# Patient Record
Sex: MALE | Race: ASIAN | HISPANIC OR LATINO
[De-identification: names, ages, dates, MRNs, and addresses within clinical notes are randomized per-mention and may not be internally consistent; named-entity substitution may affect disease eponyms.]

---

## 2020-06-22 PROBLEM — Z00.00 ENCOUNTER FOR PREVENTIVE HEALTH EXAMINATION: Status: ACTIVE | Noted: 2020-06-22

## 2020-08-06 ENCOUNTER — APPOINTMENT (OUTPATIENT)
Dept: ENDOCRINOLOGY | Facility: CLINIC | Age: 70
End: 2020-08-06
Payer: MEDICAID

## 2020-08-06 VITALS
HEART RATE: 79 BPM | SYSTOLIC BLOOD PRESSURE: 152 MMHG | OXYGEN SATURATION: 98 % | DIASTOLIC BLOOD PRESSURE: 78 MMHG | TEMPERATURE: 97.3 F | WEIGHT: 170 LBS

## 2020-08-06 DIAGNOSIS — E78.5 HYPERLIPIDEMIA, UNSPECIFIED: ICD-10-CM

## 2020-08-06 DIAGNOSIS — Z83.3 FAMILY HISTORY OF DIABETES MELLITUS: ICD-10-CM

## 2020-08-06 DIAGNOSIS — Z95.5 PRESENCE OF CORONARY ANGIOPLASTY IMPLANT AND GRAFT: ICD-10-CM

## 2020-08-06 DIAGNOSIS — Z86.79 PERSONAL HISTORY OF OTHER DISEASES OF THE CIRCULATORY SYSTEM: ICD-10-CM

## 2020-08-06 DIAGNOSIS — E53.8 DEFICIENCY OF OTHER SPECIFIED B GROUP VITAMINS: ICD-10-CM

## 2020-08-06 PROCEDURE — 99205 OFFICE O/P NEW HI 60 MIN: CPT

## 2020-08-06 PROCEDURE — 95251 CONT GLUC MNTR ANALYSIS I&R: CPT

## 2020-08-06 PROCEDURE — 95250 CONT GLUC MNTR PHYS/QHP EQP: CPT

## 2020-08-06 RX ORDER — CLOPIDOGREL 75 MG/1
75 TABLET, FILM COATED ORAL
Refills: 0 | Status: ACTIVE | COMMUNITY

## 2020-08-15 RX ORDER — EMPAGLIFLOZIN 25 MG/1
25 TABLET, FILM COATED ORAL
Qty: 90 | Refills: 3 | Status: DISCONTINUED | COMMUNITY
Start: 2020-08-06 | End: 2020-08-15

## 2020-12-23 LAB
CREAT SPEC-SCNC: 45 MG/DL
MICROALBUMIN 24H UR DL<=1MG/L-MCNC: 1.6 MG/DL
MICROALBUMIN/CREAT 24H UR-RTO: 35 MG/G

## 2020-12-28 ENCOUNTER — APPOINTMENT (OUTPATIENT)
Dept: GASTROENTEROLOGY | Facility: CLINIC | Age: 70
End: 2020-12-28

## 2021-02-24 ENCOUNTER — APPOINTMENT (OUTPATIENT)
Dept: GASTROENTEROLOGY | Facility: CLINIC | Age: 71
End: 2021-02-24
Payer: MEDICARE

## 2021-02-24 VITALS
SYSTOLIC BLOOD PRESSURE: 140 MMHG | HEIGHT: 65 IN | WEIGHT: 166 LBS | DIASTOLIC BLOOD PRESSURE: 70 MMHG | BODY MASS INDEX: 27.66 KG/M2 | TEMPERATURE: 98 F

## 2021-02-24 DIAGNOSIS — D64.9 ANEMIA, UNSPECIFIED: ICD-10-CM

## 2021-02-24 DIAGNOSIS — K59.00 CONSTIPATION, UNSPECIFIED: ICD-10-CM

## 2021-02-24 DIAGNOSIS — K21.9 GASTRO-ESOPHAGEAL REFLUX DISEASE W/OUT ESOPHAGITIS: ICD-10-CM

## 2021-02-24 PROCEDURE — 99072 ADDL SUPL MATRL&STAF TM PHE: CPT

## 2021-02-24 PROCEDURE — 99204 OFFICE O/P NEW MOD 45 MIN: CPT

## 2021-02-24 RX ORDER — POLYETHYLENE GLYCOL 3350 17 G/17G
17 POWDER, FOR SOLUTION ORAL DAILY
Qty: 30 | Refills: 3 | Status: ACTIVE | COMMUNITY
Start: 2021-02-24 | End: 1900-01-01

## 2021-02-24 RX ORDER — FAMOTIDINE 40 MG/1
40 TABLET, FILM COATED ORAL
Qty: 30 | Refills: 3 | Status: ACTIVE | COMMUNITY
Start: 2021-02-24 | End: 1900-01-01

## 2021-02-24 NOTE — PHYSICAL EXAM
[General Appearance - Alert] : alert [General Appearance - In No Acute Distress] : in no acute distress [Sclera] : the sclera and conjunctiva were normal [Hearing Threshold Finger Rub Not [place]] : hearing was normal [Respiration, Rhythm And Depth] : normal respiratory rhythm and effort [Auscultation Breath Sounds / Voice Sounds] : lungs were clear to auscultation bilaterally [Heart Sounds] : normal S1 and S2 [Bowel Sounds] : normal bowel sounds [Abdomen Soft] : soft [Abdomen Tenderness] : non-tender [Abnormal Walk] : normal gait [Nail Clubbing] : no clubbing  or cyanosis of the fingernails [] : no rash [Skin Lesions] : no skin lesions [Edema] : there was no peripheral edema [No Focal Deficits] : no focal deficits [Oriented To Time, Place, And Person] : oriented to person, place, and time

## 2021-02-24 NOTE — HISTORY OF PRESENT ILLNESS
[FreeTextEntry1] : 71 yo  male with h/o CAD s/p stent in past, DM, HTN with c/o gerd partially relieved on ppi for 3 years, pt with h/o anemia started on iron and hgb increased. Patient reports occasional constipation. no brbpr, no melena. no weight loss. no dysphagia, no n/v.\par \par no family h/o colon or gastric cancer\par h/o egd/colonoscopy per patient in [place] 4 years ago, not clean per patient.

## 2021-02-24 NOTE — ASSESSMENT
[FreeTextEntry1] : 1. GERD, pt on asa/plavix for h/o CAD with stent, refractory to ppi daily\par \par plan strict antireflux diet diet\par           add famotidine qhs\par        recommend egd r/o esophagitis, after cardiology clearance\par \par 2. constipation, h/o anemia ,on iron, recommend colonoscopy to r/o polyp,mass etc\par \par plan colonoscopy after cardiology clearance\par          miralax daily

## 2021-02-24 NOTE — REVIEW OF SYSTEMS
[As noted in HPI] : as noted in HPI [As Noted in HPI] : as noted in HPI [Fever] : no fever [Chills] : no chills [Eyesight Problems] : no eyesight problems [Nosebleeds] : no nosebleeds [Hesitancy] : no urinary hesitancy [Nocturia] : no nocturia [Joint Swelling] : no joint swelling [Dizziness] : no dizziness [Fainting] : no fainting [Anxiety] : no anxiety [Depression] : no depression [Muscle Weakness] : no muscle weakness [Erectile Dysfunction] : no erectile dysfunction [Easy Bleeding] : no tendency for easy bleeding [Easy Bruising] : no tendency for easy bruising [Swollen Glands] : no swollen glands

## 2021-03-15 ENCOUNTER — APPOINTMENT (OUTPATIENT)
Dept: ENDOCRINOLOGY | Facility: CLINIC | Age: 71
End: 2021-03-15

## 2021-04-06 ENCOUNTER — APPOINTMENT (OUTPATIENT)
Dept: ENDOCRINOLOGY | Facility: CLINIC | Age: 71
End: 2021-04-06
Payer: MEDICARE

## 2021-04-06 VITALS
DIASTOLIC BLOOD PRESSURE: 70 MMHG | HEIGHT: 65 IN | HEART RATE: 76 BPM | OXYGEN SATURATION: 99 % | SYSTOLIC BLOOD PRESSURE: 120 MMHG | BODY MASS INDEX: 27.66 KG/M2 | WEIGHT: 166 LBS

## 2021-04-06 LAB
GLUCOSE BLDC GLUCOMTR-MCNC: 176
HBA1C MFR BLD HPLC: 7.5

## 2021-04-06 PROCEDURE — 99072 ADDL SUPL MATRL&STAF TM PHE: CPT

## 2021-04-06 PROCEDURE — 99214 OFFICE O/P EST MOD 30 MIN: CPT | Mod: 25

## 2021-04-06 PROCEDURE — 83036 HEMOGLOBIN GLYCOSYLATED A1C: CPT | Mod: QW

## 2021-08-09 ENCOUNTER — APPOINTMENT (OUTPATIENT)
Dept: ENDOCRINOLOGY | Facility: CLINIC | Age: 71
End: 2021-08-09
Payer: MEDICAID

## 2021-08-09 VITALS
WEIGHT: 165 LBS | BODY MASS INDEX: 27.49 KG/M2 | HEART RATE: 74 BPM | DIASTOLIC BLOOD PRESSURE: 80 MMHG | HEIGHT: 65 IN | TEMPERATURE: 97.7 F | SYSTOLIC BLOOD PRESSURE: 124 MMHG | OXYGEN SATURATION: 99 %

## 2021-08-09 PROCEDURE — 95251 CONT GLUC MNTR ANALYSIS I&R: CPT

## 2021-08-09 PROCEDURE — 95250 CONT GLUC MNTR PHYS/QHP EQP: CPT

## 2021-08-09 PROCEDURE — 99214 OFFICE O/P EST MOD 30 MIN: CPT

## 2021-08-09 RX ORDER — INSULIN LISPRO 100 U/ML
100 INJECTION, SOLUTION SUBCUTANEOUS
Qty: 3 | Refills: 1 | Status: DISCONTINUED | COMMUNITY
Start: 1900-01-01 | End: 2021-08-09

## 2021-08-09 RX ORDER — EMPAGLIFLOZIN 25 MG/1
25 TABLET, FILM COATED ORAL
Qty: 90 | Refills: 1 | Status: DISCONTINUED | COMMUNITY
Start: 2021-04-29 | End: 2021-08-09

## 2021-08-10 LAB
CREAT SPEC-SCNC: 22 MG/DL
MICROALBUMIN 24H UR DL<=1MG/L-MCNC: <1.2 MG/DL
MICROALBUMIN/CREAT 24H UR-RTO: NORMAL MG/G

## 2021-08-24 ENCOUNTER — NON-APPOINTMENT (OUTPATIENT)
Age: 71
End: 2021-08-24

## 2021-08-24 RX ORDER — BLOOD-GLUCOSE METER
W/DEVICE EACH MISCELLANEOUS
Qty: 1 | Refills: 0 | Status: ACTIVE | COMMUNITY
Start: 2021-08-24 | End: 1900-01-01

## 2021-09-28 ENCOUNTER — NON-APPOINTMENT (OUTPATIENT)
Age: 71
End: 2021-09-28

## 2021-10-01 ENCOUNTER — RX RENEWAL (OUTPATIENT)
Age: 71
End: 2021-10-01

## 2021-10-04 ENCOUNTER — RX RENEWAL (OUTPATIENT)
Age: 71
End: 2021-10-04

## 2021-11-09 ENCOUNTER — APPOINTMENT (OUTPATIENT)
Dept: ENDOCRINOLOGY | Facility: CLINIC | Age: 71
End: 2021-11-09
Payer: MEDICAID

## 2021-11-09 ENCOUNTER — RESULT CHARGE (OUTPATIENT)
Age: 71
End: 2021-11-09

## 2021-11-09 VITALS — BODY MASS INDEX: 26.39 KG/M2 | WEIGHT: 158.6 LBS

## 2021-11-09 LAB
GLUCOSE BLDC GLUCOMTR-MCNC: 239
HBA1C MFR BLD HPLC: 7.4

## 2021-11-09 PROCEDURE — G0108 DIAB MANAGE TRN  PER INDIV: CPT

## 2021-11-09 PROCEDURE — 83036 HEMOGLOBIN GLYCOSYLATED A1C: CPT | Mod: QW

## 2021-11-09 RX ORDER — LANCETS 30 GAUGE
EACH MISCELLANEOUS
Qty: 4 | Refills: 2 | Status: ACTIVE | COMMUNITY
Start: 2020-08-06 | End: 1900-01-01

## 2022-02-18 ENCOUNTER — APPOINTMENT (OUTPATIENT)
Dept: ENDOCRINOLOGY | Facility: CLINIC | Age: 72
End: 2022-02-18

## 2022-03-07 ENCOUNTER — RX RENEWAL (OUTPATIENT)
Age: 72
End: 2022-03-07

## 2022-03-07 RX ORDER — BLOOD SUGAR DIAGNOSTIC
STRIP MISCELLANEOUS
Qty: 350 | Refills: 0 | Status: ACTIVE | COMMUNITY
Start: 2020-08-06 | End: 1900-01-01

## 2022-04-13 ENCOUNTER — APPOINTMENT (OUTPATIENT)
Dept: ENDOCRINOLOGY | Facility: CLINIC | Age: 72
End: 2022-04-13
Payer: MEDICARE

## 2022-04-13 VITALS
DIASTOLIC BLOOD PRESSURE: 70 MMHG | WEIGHT: 152 LBS | TEMPERATURE: 96.7 F | HEART RATE: 79 BPM | OXYGEN SATURATION: 99 % | SYSTOLIC BLOOD PRESSURE: 126 MMHG | BODY MASS INDEX: 25.33 KG/M2 | HEIGHT: 65 IN

## 2022-04-13 PROCEDURE — 99214 OFFICE O/P EST MOD 30 MIN: CPT | Mod: GC

## 2022-04-13 PROCEDURE — 82962 GLUCOSE BLOOD TEST: CPT

## 2022-04-13 PROCEDURE — 83036 HEMOGLOBIN GLYCOSYLATED A1C: CPT | Mod: QW

## 2022-04-13 RX ORDER — INSULIN ASPART 100 [IU]/ML
100 INJECTION, SOLUTION INTRAVENOUS; SUBCUTANEOUS
Qty: 2 | Refills: 1 | Status: ACTIVE | COMMUNITY
Start: 2021-04-29 | End: 1900-01-01

## 2022-04-13 RX ORDER — ERTUGLIFLOZIN 5 MG/1
5 TABLET, FILM COATED ORAL
Qty: 90 | Refills: 1 | Status: DISCONTINUED | COMMUNITY
Start: 2020-08-15 | End: 2022-04-13

## 2022-04-13 RX ORDER — INSULIN GLARGINE 100 [IU]/ML
100 INJECTION, SOLUTION SUBCUTANEOUS
Qty: 1 | Refills: 1 | Status: ACTIVE | COMMUNITY
Start: 1900-01-01 | End: 1900-01-01

## 2022-04-13 RX ORDER — ATORVASTATIN CALCIUM 40 MG/1
40 TABLET, FILM COATED ORAL
Qty: 1 | Refills: 1 | Status: ACTIVE | COMMUNITY
Start: 1900-01-01 | End: 1900-01-01

## 2022-04-14 LAB
GLUCOSE BLDC GLUCOMTR-MCNC: 231
HBA1C MFR BLD HPLC: 8.6

## 2022-04-14 NOTE — HISTORY OF PRESENT ILLNESS
[FreeTextEntry1] : 71 y/o male with T2DM (A1c 7.4% in Nov 2021 and 8.6% today). HTN and HLD.\par \par Interval Events:\par Patient is currently fasting during Ramadan and eats at sunrise, sunset 7:30pm and then dinner at 11pm\par \par DM was diagnosed 20+ years ago\par Complications include: No neuropathy, last ophtho visit was March 2021(had dilated eye exam), no h/o CVA. Has CAD s/p 3 stents (last stent in 2014).\par \par Current DM Medications/Insulin: \par Currently on Steglatro 5 mg Qday, Janumet 50/1000 mg BID, Lantus 15 units QHS, Humalog 10 units before meal at sunset, 7 units before dinner at 11pm. If FS is 70-80 then he will take 1/2 tab Janumet\par Was previously on Jardiance with Steglatro but Jardiance was stopped. Insurance sent a letter stating Jardiance is currently preferred over steglatro.\par Current Fingerstick Glucose Logs: \par Fasting - . With BG 70, felt he could not sleep. No sweats or shakes. No recent true lows.\par 2 hours after breakfast - 116-247\par before Lunch - \par After Lunch - 156-260\par Before Dinner 187-280\par After  dinner 181-351\par Bedtime 241-390\par Overnight has had BG in the 400s.\par \par Has s/s hypo when FS <60 and will correct with ice-cream or juice if too low. Other times eats some dates.\par \par Current Diet Includes: During the day also eats dates\par Breakfast 5am: oatmeal, 2 slices of bread, zeferino tea (milk)\par 7:30pm meal: Bread, vegetables, murphy\par 11pm meal: Rice and vegetables, roti\par Snacks: Dates - 3 to 4\par Drinks: no juice or soda\par \par Additional history:\par HTN - takes Losartan 25 mg QDay, Metoprolol 25 mg QDay\par HLD - Atorvastatin 40 mg QDay\par Takes Vitamin B12 QDay\par \par Patient asking about GLP-1 agonist. No hx of pancreatitis or personal or FHx of thyroid cancer.

## 2022-04-14 NOTE — END OF VISIT
[] : Fellow [FreeTextEntry3] : Agree with plan as above. Patient fasting during Ramadan, so eats a very heavy 7:30 pm sunset meal and will increase Humalog for this meal and back off Lantus QHS to avoid hypoglycemia, will also reduce Humalog at 11pm meal since its a smaller meal. Continue current oral agents. After NARGIS, at next endocrine visit, can consider stopping Januvia and starting GLP-1 agent.\par We discussed that mild hypoglycemia (BG<70mg/dl) should be corrected with 4oz juice or 3 glucose tablets and then recheck glucose in 15 minutes\par \par \par I informed the patient that I will be leaving the practice. Recommend schedule f/u with next available endocrinologist in 6 months.\par F/u Endocrine NP in 3 months\par \par Valerio Ayala DO\par \par

## 2022-04-14 NOTE — PHYSICAL EXAM
[Alert] : alert [Well Nourished] : well nourished [No Acute Distress] : no acute distress [EOMI] : extra ocular movement intact [No Proptosis] : no proptosis [No Lid Lag] : no lid lag [Supple] : the neck was supple [No Respiratory Distress] : no respiratory distress [No Accessory Muscle Use] : no accessory muscle use [Normal Rate and Effort] : normal respiratory rate and effort [Normal Rate] : heart rate was normal [Regular Rhythm] : with a regular rhythm [No Edema] : no peripheral edema [Not Tender] : non-tender [Soft] : abdomen soft [No Involuntary Movements] : no involuntary movements were seen [No Joint Swelling] : no joint swelling seen [No Rash] : no rash [Cranial Nerves Intact] : cranial nerves 2-12 were intact [No Motor Deficits] : the motor exam was normal [Oriented x3] : oriented to person, place, and time [Normal Affect] : the affect was normal [Normal Mood] : the mood was normal [Healthy Appearance] : healthy appearance [Well Developed] : well developed [Normal Hearing] : hearing was normal [Thyroid Not Enlarged] : the thyroid was not enlarged [No Murmurs] : no murmurs [Normal Bowel Sounds] : normal bowel sounds [Not Distended] : not distended [de-identified] : Has a seborrheic keratosis on left ankle.

## 2022-04-14 NOTE — REVIEW OF SYSTEMS
[All other systems negative] : All other systems negative [Fatigue] : no fatigue [Recent Weight Gain (___ Lbs)] : no recent weight gain [Recent Weight Loss (___ Lbs)] : no recent weight loss

## 2022-04-14 NOTE — ASSESSMENT
[Diabetes Foot Care] : diabetes foot care [Long Term Vascular Complications] : long term vascular complications of diabetes [Carbohydrate Consistent Diet] : carbohydrate consistent diet [Importance of Diet and Exercise] : importance of diet and exercise to improve glycemic control, achieve weight loss and improve cardiovascular health [Exercise/Effect on Glucose] : exercise/effect on glucose [Hypoglycemia Management] : hypoglycemia management [Action and use of Insulin] : action and use of short and long-acting insulin [Self Monitoring of Blood Glucose] : self monitoring of blood glucose [Injection Technique, Storage, Sharps Disposal] : injection technique, storage, and sharps disposal [Sick-Day Management] : sick-day management [Retinopathy Screening] : Patient was referred to ophthalmology for retinopathy screening [Diabetic Medications] : Risks and benefits of diabetic medications were discussed [FreeTextEntry1] : 71 y/o male with well controlled T2DM with history of moderate hypoglycemia, h/o CAD s/p 3 stents here for management. Also with HTN, HLD, and Vitamin B12 deficiency\par \par T2DM\par - I had a lengthy discussion regarding healthy diet (consistent carbohydrates and low calorie content) with the patient. I also emphasized to maintain routine exercise activity (30 minutes daily or 150 minutes in the week).\par - Goal BG \par - I discussed the importance of avoiding mild hypoglycemia (FS<70 mg/dl) and severe hypoglycemia (FS<55 mg/dl) with the patient. I also discussed hypoglycemia correction with 4 oz of juice or 4 glucose tablets and rechecking FS in 15 minutes. If FS is still low, repeat 4oz juice or 4 glucose tablets and consume 12 grams of carbohydrates.\par - Fasting for Ramadan. Will reduce Lantus to 13 units qhs. To take Admelog 14 units before 7:30pm meal (biggest meal causing Fs in 300-400s at night) and 5 units if having fruits around 10-11pm. \par - If fasting BG <100 for 5:30am meal, will take half tab of Janumet. If >100, will take whole tab.\par - Switch from Steglatro 5mg daily to Jardiance 10mg daily.\par - Takes iron but no longer on Vitamin B12 or vitamin D3\par - urine microalbumin normal in Aug 2021, already taking Losartan 25 mg QD\par - Continue to see podiatrist\par - Sending Amadou to pharmacy to wear during Ramadan. Worthwhile even if he has to pay out of [place]ocket to avoid FS checks\par \par HTN\par - Recommend continue Losartan 25 mg QD and Metoprolol 25 mg QD\par \par HLD\par - Recommend continue Atorvastatin 40 mg QHS\par - LDL 48 in Feb 2022\par \par F/u 3 month NP\par F/u 6 month endocrinologist\par \par D/w Dr. Ayala\par Jude Ritchie, \par \par \par \par \par \par \par

## 2022-07-14 ENCOUNTER — APPOINTMENT (OUTPATIENT)
Dept: ENDOCRINOLOGY | Facility: CLINIC | Age: 72
End: 2022-07-14

## 2022-07-14 VITALS
TEMPERATURE: 97.3 F | BODY MASS INDEX: 25.66 KG/M2 | DIASTOLIC BLOOD PRESSURE: 70 MMHG | WEIGHT: 154 LBS | OXYGEN SATURATION: 99 % | HEIGHT: 65 IN | HEART RATE: 72 BPM | SYSTOLIC BLOOD PRESSURE: 120 MMHG

## 2022-07-14 LAB
GLUCOSE BLDC GLUCOMTR-MCNC: 145
HBA1C MFR BLD HPLC: 7.5

## 2022-07-14 PROCEDURE — 83036 HEMOGLOBIN GLYCOSYLATED A1C: CPT | Mod: QW

## 2022-07-14 PROCEDURE — 99214 OFFICE O/P EST MOD 30 MIN: CPT | Mod: 25

## 2022-07-14 PROCEDURE — 82962 GLUCOSE BLOOD TEST: CPT

## 2022-07-14 RX ORDER — INSULIN GLARGINE 100 [IU]/ML
100 INJECTION, SOLUTION SUBCUTANEOUS
Qty: 4 | Refills: 3 | Status: DISCONTINUED | COMMUNITY
Start: 2021-08-09 | End: 2022-07-14

## 2022-07-14 RX ORDER — EMPAGLIFLOZIN 10 MG/1
10 TABLET, FILM COATED ORAL DAILY
Qty: 1 | Refills: 0 | Status: DISCONTINUED | COMMUNITY
Start: 2021-05-07 | End: 2022-07-14

## 2022-07-14 RX ORDER — FLASH GLUCOSE SCANNING READER
EACH MISCELLANEOUS
Qty: 1 | Refills: 0 | Status: DISCONTINUED | COMMUNITY
Start: 2020-08-06 | End: 2022-07-14

## 2022-07-14 RX ORDER — FLASH GLUCOSE SENSOR
KIT MISCELLANEOUS
Qty: 5 | Refills: 1 | Status: DISCONTINUED | COMMUNITY
Start: 2020-08-06 | End: 2022-07-14

## 2022-07-14 RX ORDER — ASPIRIN 81 MG/1
81 TABLET, CHEWABLE ORAL
Refills: 0 | Status: DISCONTINUED | COMMUNITY
End: 2022-07-14

## 2022-07-14 RX ORDER — FLASH GLUCOSE SCANNING READER
EACH MISCELLANEOUS
Qty: 1 | Refills: 0 | Status: DISCONTINUED | COMMUNITY
Start: 2022-04-13 | End: 2022-07-14

## 2022-07-14 RX ORDER — SITAGLIPTIN AND METFORMIN HYDROCHLORIDE 50; 1000 MG/1; MG/1
50-1000 TABLET, FILM COATED ORAL TWICE DAILY
Qty: 180 | Refills: 1 | Status: DISCONTINUED | COMMUNITY
End: 2022-07-14

## 2022-07-14 RX ORDER — FLASH GLUCOSE SENSOR
KIT MISCELLANEOUS
Qty: 2 | Refills: 3 | Status: DISCONTINUED | COMMUNITY
Start: 2022-04-13 | End: 2022-07-14

## 2022-07-14 RX ORDER — EMPAGLIFLOZIN 10 MG/1
10 TABLET, FILM COATED ORAL
Qty: 30 | Refills: 5 | Status: DISCONTINUED | COMMUNITY
Start: 2022-04-13 | End: 2022-07-14

## 2022-07-14 NOTE — HISTORY OF PRESENT ILLNESS
[FreeTextEntry1] : 71 y/o male with T2DM (A1c 7.4% in Nov 2021 and 8.6% today). HTN and HLD.\par \par DM was diagnosed 20+ years ago\par Complications include: No neuropathy, last ophtho visit was March 2021(had dilated eye exam), no h/o CVA. Has CAD s/p 3 stents (last stent in 2014).\par \par Current DM Medications/Insulin: \par Jardiance 10 mg daily\par Janumet 50-1000mg one tablet BID\par Lantus to 13 units qhs\par Admelog 14 units -- only taking before lunch and dinner\par \par Current Fingerstick Glucose Logs: \par glucose scanned. \par Sometimes checking 1-2 hours after eating. \par there is low of 53 at 2am. \par Has s/s hypo when FS <60 and will correct with ice-cream or juice if too low. Other times eats some dates.\par \par Current Diet Includes: During the day also eats dates\par Breakfast 730-8am: oatmeal, 2 slices of bread, zeferino tea (milk)\par Lunch 2-230\par Dinner 9pm\par \par Additional history:\par HTN - takes Losartan 25 mg QDay, Metoprolol 25 mg QDay\par HLD - Atorvastatin 40 mg QDay\par Takes Vitamin B12 QDay\par \par Patient asking about GLP-1 agonist. No hx of pancreatitis or personal or FHx of thyroid cancer.

## 2022-07-14 NOTE — ASSESSMENT
[Diabetes Foot Care] : diabetes foot care [Long Term Vascular Complications] : long term vascular complications of diabetes [Carbohydrate Consistent Diet] : carbohydrate consistent diet [Importance of Diet and Exercise] : importance of diet and exercise to improve glycemic control, achieve weight loss and improve cardiovascular health [Exercise/Effect on Glucose] : exercise/effect on glucose [Hypoglycemia Management] : hypoglycemia management [Action and use of Insulin] : action and use of short and long-acting insulin [Self Monitoring of Blood Glucose] : self monitoring of blood glucose [Injection Technique, Storage, Sharps Disposal] : injection technique, storage, and sharps disposal [Sick-Day Management] : sick-day management [Retinopathy Screening] : Patient was referred to ophthalmology for retinopathy screening [Diabetic Medications] : Risks and benefits of diabetic medications were discussed [FreeTextEntry1] : 71 y/o male with well controlled T2DM with history of moderate hypoglycemia, h/o CAD 3-4 2018 s/p 3 stents here for management. Also with HTN, HLD, and Vitamin B12 deficiency\par \par T2DM\par A1c 7.5% 7/14/22\par - Goal BG \par - I discussed the importance of avoiding mild hypoglycemia (FS<70 mg/dl) and severe hypoglycemia (FS<55 mg/dl) with the patient. I also discussed hypoglycemia correction with 4 oz of juice or 4 glucose tablets and rechecking FS in 15 minutes. If FS is still low, repeat 4oz juice or 4 glucose tablets and consume 12 grams of carbohydrates.\par -optho: went 5/2022, no diabetic retinopathy\par \par Jardiance increase to 25 mg daily. \par Stop Janumet.\par Start Metformin 1000 mg twice daily. \par Start Ozempic 0.25 mg once weekly \par Lantus to 13 units qhs--> Decrease to Lantus 10 units at bedtime \par Humalog 10 units before lunch and 5 units before dinner. \par \par Morning goal .\par Two hours after a meal, <180. \par \par Send Amadou. Amadou 2, will come in for training. \par \par - urine microalbumin normal in Aug 2021, already taking Losartan 25 mg QD\par - Continue to see podiatrist\par \par HTN\par - Recommend continue Losartan 25 mg QD and Metoprolol 25 mg QD\par \par HLD\par - Recommend continue Atorvastatin 40 mg QHS\par - LDL 48 in Feb 2022\par \par -Patient requires a therapeutic CGM\par -Patient has diabetes mellitus\par -Patient has been using a home blood glucose monitor and performing frequent (four times a day) testing, 6 months\par -Patient is being insulin-treated with multiple daily injections (MDI) of insulin x4day, x 6months \par -Patient insulin treatment regimen requires frequent adjustments by the Patient on the basis of therapeutic CGM testing results.\par

## 2022-07-14 NOTE — PHYSICAL EXAM
[Alert] : alert [Well Nourished] : well nourished [Healthy Appearance] : healthy appearance [No Acute Distress] : no acute distress [Well Developed] : well developed [EOMI] : extra ocular movement intact [No Proptosis] : no proptosis [No Lid Lag] : no lid lag [Normal Hearing] : hearing was normal [Supple] : the neck was supple [Thyroid Not Enlarged] : the thyroid was not enlarged [No Respiratory Distress] : no respiratory distress [No Accessory Muscle Use] : no accessory muscle use [Normal Rate and Effort] : normal respiratory rate and effort [No Murmurs] : no murmurs [Normal Rate] : heart rate was normal [Regular Rhythm] : with a regular rhythm [No Edema] : no peripheral edema [Normal Bowel Sounds] : normal bowel sounds [Not Tender] : non-tender [Not Distended] : not distended [Soft] : abdomen soft [No Involuntary Movements] : no involuntary movements were seen [No Joint Swelling] : no joint swelling seen [No Rash] : no rash [Cranial Nerves Intact] : cranial nerves 2-12 were intact [No Motor Deficits] : the motor exam was normal [Oriented x3] : oriented to person, place, and time [Normal Affect] : the affect was normal [Normal Mood] : the mood was normal [de-identified] : Has a seborrheic keratosis on left ankle.

## 2022-07-15 ENCOUNTER — NON-APPOINTMENT (OUTPATIENT)
Age: 72
End: 2022-07-15

## 2022-07-15 LAB
25(OH)D3 SERPL-MCNC: 26.9 NG/ML
ALBUMIN SERPL ELPH-MCNC: 4.5 G/DL
ALP BLD-CCNC: 69 U/L
ALT SERPL-CCNC: 18 U/L
ANION GAP SERPL CALC-SCNC: 11 MMOL/L
AST SERPL-CCNC: 16 U/L
BILIRUB SERPL-MCNC: 0.4 MG/DL
BUN SERPL-MCNC: 17 MG/DL
CALCIUM SERPL-MCNC: 9.4 MG/DL
CHLORIDE SERPL-SCNC: 102 MMOL/L
CHOLEST SERPL-MCNC: 100 MG/DL
CO2 SERPL-SCNC: 25 MMOL/L
CREAT SERPL-MCNC: 1.08 MG/DL
CREAT SPEC-SCNC: 44 MG/DL
EGFR: 74 ML/MIN/1.73M2
GLUCOSE SERPL-MCNC: 132 MG/DL
HDLC SERPL-MCNC: 40 MG/DL
LDLC SERPL CALC-MCNC: 47 MG/DL
MICROALBUMIN 24H UR DL<=1MG/L-MCNC: <1.2 MG/DL
MICROALBUMIN/CREAT 24H UR-RTO: NORMAL MG/G
NONHDLC SERPL-MCNC: 60 MG/DL
POTASSIUM SERPL-SCNC: 5 MMOL/L
PROT SERPL-MCNC: 7.4 G/DL
SODIUM SERPL-SCNC: 137 MMOL/L
TRIGL SERPL-MCNC: 66 MG/DL
TSH SERPL-ACNC: 1.82 UIU/ML
VIT B12 SERPL-MCNC: 317 PG/ML

## 2022-08-25 ENCOUNTER — APPOINTMENT (OUTPATIENT)
Dept: ENDOCRINOLOGY | Facility: CLINIC | Age: 72
End: 2022-08-25

## 2022-08-25 PROCEDURE — 99214 OFFICE O/P EST MOD 30 MIN: CPT

## 2022-08-25 NOTE — PHYSICAL EXAM
[Alert] : alert [Well Nourished] : well nourished [Healthy Appearance] : healthy appearance [No Acute Distress] : no acute distress [Well Developed] : well developed [EOMI] : extra ocular movement intact [No Proptosis] : no proptosis [No Lid Lag] : no lid lag [Normal Hearing] : hearing was normal [Supple] : the neck was supple [Thyroid Not Enlarged] : the thyroid was not enlarged [No Respiratory Distress] : no respiratory distress [No Accessory Muscle Use] : no accessory muscle use [Normal Rate and Effort] : normal respiratory rate and effort [No Murmurs] : no murmurs [Normal Rate] : heart rate was normal [Regular Rhythm] : with a regular rhythm [No Edema] : no peripheral edema [Normal Bowel Sounds] : normal bowel sounds [Not Tender] : non-tender [Not Distended] : not distended [Soft] : abdomen soft [No Involuntary Movements] : no involuntary movements were seen [No Joint Swelling] : no joint swelling seen [No Rash] : no rash [Cranial Nerves Intact] : cranial nerves 2-12 were intact [No Motor Deficits] : the motor exam was normal [Oriented x3] : oriented to person, place, and time [Normal Affect] : the affect was normal [Normal Mood] : the mood was normal [de-identified] : Has a seborrheic keratosis on left ankle.

## 2022-08-25 NOTE — HISTORY OF PRESENT ILLNESS
[FreeTextEntry1] : 73 y/o male with T2DM (A1c 7.4% in Nov 2021 and 8.6% today). HTN and HLD.\par \par DM was diagnosed 20+ years ago\par Complications include: No neuropathy, last ophtho visit was March 2021(had dilated eye exam), no h/o CVA. Has CAD s/p 3 stents (last stent in 2014).\par \par Current DM Medications/Insulin: \par Jardiance  25 mg daily. \par Metformin 1000 mg twice daily. \par Ozempic 0.25 mg once weekly \par Lantus  10 units at bedtime \par Humalog 10 units before lunch and 5 units before dinner. \par \par Current Fingerstick Glucose Logs: \par glucose scanned. \par Lows: will correct with ice-cream or juice if too low. Other times eats some dates.\par Trends: overnight lows, post-prandial highs after dinner, although not enough data, patient had issues getting CGM Amadou.\par Will decrease basal insulin to offset lows overnight. \par \par Current Diet Includes: During the day also eats dates\par Breakfast 730-8am: oatmeal, 2 slices of bread, zeferino tea (milk)\par Lunch 2-230\par Dinner 9pm\par \par Additional history:\par HTN - takes Losartan 25 mg QDay, Metoprolol 25 mg QDay\par HLD - Atorvastatin 40 mg QDay\par Takes Vitamin B12 QDay\par \par Patient asking about GLP-1 agonist. No hx of pancreatitis or personal or FHx of thyroid cancer.

## 2022-08-25 NOTE — ASSESSMENT
[Diabetes Foot Care] : diabetes foot care [Long Term Vascular Complications] : long term vascular complications of diabetes [Carbohydrate Consistent Diet] : carbohydrate consistent diet [Importance of Diet and Exercise] : importance of diet and exercise to improve glycemic control, achieve weight loss and improve cardiovascular health [Exercise/Effect on Glucose] : exercise/effect on glucose [Hypoglycemia Management] : hypoglycemia management [Action and use of Insulin] : action and use of short and long-acting insulin [Self Monitoring of Blood Glucose] : self monitoring of blood glucose [Injection Technique, Storage, Sharps Disposal] : injection technique, storage, and sharps disposal [Sick-Day Management] : sick-day management [Retinopathy Screening] : Patient was referred to ophthalmology for retinopathy screening [Diabetic Medications] : Risks and benefits of diabetic medications were discussed [FreeTextEntry1] : 73 y/o male with well controlled T2DM with history of moderate hypoglycemia, h/o CAD 3-4 2018 s/p 3 stents here for management. Also with HTN, HLD, and Vitamin B12 deficiency\par \par T2DM\par A1c 7.5% 7/14/22\par - Goal BG \par - I discussed the importance of avoiding mild hypoglycemia (FS<70 mg/dl) and severe hypoglycemia (FS<55 mg/dl) with the patient. I also discussed hypoglycemia correction with 4 oz of juice or 4 glucose tablets and rechecking FS in 15 minutes. If FS is still low, repeat 4oz juice or 4 glucose tablets and consume 12 grams of carbohydrates.\par -optho: went 5/2022, no diabetic retinopathy\par \par Readings are low overnight, running high after dinner. However, I would prefer some CGM data. Plan to titrate off of insulin if possible. Sometimes running 176 after eating dinner. For now will increase Ozempic and 20% decrease of Lantus, will keep prandial insulin doses the same along with Jardiance and Metformin. \par \par PLAN:\par Jardiance  25 mg daily. \par Metformin 1000 mg twice daily. \par Ozempic 0.25 mg once weekly --> Increase to 0.5 mg weekly. \par Lantus  10 units at bedtime --> decrease to 7 units daily\par Humalog 10 units before lunch and 5 units before dinner. \par \par Morning goal .\par Two hours after a meal, <180. \par \par Send Amadou. Amadou 2, will come in for training. \par \par - urine microalbumin normal in Aug 2021, already taking Losartan 25 mg QD\par - Continue to see podiatrist\par \par HTN\par - Recommend continue Losartan 25 mg QD and Metoprolol 25 mg QD\par \par HLD\par - Recommend continue Atorvastatin 40 mg QHS\par - LDL 48 in Feb 2022\par \par -Patient requires a therapeutic CGM\par -Patient has diabetes mellitus\par -Patient has been using a home blood glucose monitor and performing frequent (four times a day) testing, 6 months\par -Patient is being insulin-treated with multiple daily injections (MDI) of insulin x4day, x 6months \par -Patient insulin treatment regimen requires frequent adjustments by the Patient on the basis of therapeutic CGM testing results.\par

## 2022-10-13 ENCOUNTER — APPOINTMENT (OUTPATIENT)
Dept: ENDOCRINOLOGY | Facility: CLINIC | Age: 72
End: 2022-10-13

## 2022-11-10 ENCOUNTER — APPOINTMENT (OUTPATIENT)
Dept: ENDOCRINOLOGY | Facility: CLINIC | Age: 72
End: 2022-11-10

## 2022-11-10 VITALS
SYSTOLIC BLOOD PRESSURE: 104 MMHG | WEIGHT: 142 LBS | DIASTOLIC BLOOD PRESSURE: 68 MMHG | HEART RATE: 93 BPM | HEIGHT: 65 IN | BODY MASS INDEX: 23.66 KG/M2 | TEMPERATURE: 97.6 F | OXYGEN SATURATION: 98 %

## 2022-11-10 PROCEDURE — 99215 OFFICE O/P EST HI 40 MIN: CPT | Mod: 25

## 2022-11-10 PROCEDURE — 83036 HEMOGLOBIN GLYCOSYLATED A1C: CPT | Mod: QW

## 2022-11-10 PROCEDURE — 95251 CONT GLUC MNTR ANALYSIS I&R: CPT

## 2022-11-11 LAB — HBA1C MFR BLD HPLC: 6.6

## 2023-02-21 ENCOUNTER — APPOINTMENT (OUTPATIENT)
Dept: ENDOCRINOLOGY | Facility: CLINIC | Age: 73
End: 2023-02-21
Payer: MEDICARE

## 2023-02-21 VITALS
HEART RATE: 83 BPM | WEIGHT: 140 LBS | RESPIRATION RATE: 14 BRPM | HEIGHT: 64 IN | BODY MASS INDEX: 23.9 KG/M2 | DIASTOLIC BLOOD PRESSURE: 64 MMHG | SYSTOLIC BLOOD PRESSURE: 116 MMHG | OXYGEN SATURATION: 99 %

## 2023-02-21 PROCEDURE — 99214 OFFICE O/P EST MOD 30 MIN: CPT | Mod: 25

## 2023-02-21 PROCEDURE — 95251 CONT GLUC MNTR ANALYSIS I&R: CPT

## 2023-02-21 PROCEDURE — 83036 HEMOGLOBIN GLYCOSYLATED A1C: CPT | Mod: QW

## 2023-02-24 LAB — HBA1C MFR BLD HPLC: 7.8

## 2023-05-10 ENCOUNTER — NON-APPOINTMENT (OUTPATIENT)
Age: 73
End: 2023-05-10

## 2023-05-15 ENCOUNTER — APPOINTMENT (OUTPATIENT)
Dept: ENDOCRINOLOGY | Facility: CLINIC | Age: 73
End: 2023-05-15
Payer: MEDICARE

## 2023-05-15 VITALS
DIASTOLIC BLOOD PRESSURE: 60 MMHG | BODY MASS INDEX: 20.57 KG/M2 | HEART RATE: 92 BPM | OXYGEN SATURATION: 98 % | WEIGHT: 128 LBS | SYSTOLIC BLOOD PRESSURE: 100 MMHG | HEIGHT: 66 IN

## 2023-05-15 PROCEDURE — 99214 OFFICE O/P EST MOD 30 MIN: CPT | Mod: 25

## 2023-05-15 PROCEDURE — 95251 CONT GLUC MNTR ANALYSIS I&R: CPT

## 2023-05-15 RX ORDER — LOSARTAN POTASSIUM 25 MG/1
25 TABLET, FILM COATED ORAL DAILY
Qty: 3 | Refills: 1 | Status: DISCONTINUED | COMMUNITY
End: 2023-05-15

## 2023-05-15 NOTE — HISTORY OF PRESENT ILLNESS
[FreeTextEntry1] : 74 y/o male with T2DM here for a follow up.\par \par Since the last visit, he has been in a good health. \par DM was diagnosed 20+ years ago\par Complications include: No neuropathy, no h/o CVA. Has CAD s/p 3 stents (last stent in 2014).\par Optho:Self-reported, last seen in January 2020, no retinopathy as per patient's report\par \par  A1c, 3/17/2023: 8.2%\par \par Current DM Medications/Insulin: \par Ozempic 2 mg once weekly  \par Lantus 12 units\par HL 4 U before meals. --  patient only took this for a few days and said he wanted to wait to talk to me first.\par Jardiance 25 mg daily\par Metformin at 1000 mg twice daily\par \par Current Fingerstick Glucose Logs: \par  46% high, target 24%, very high 30%\par There is significant glucose variability throughout the day, and day-to-day variability.  The patient's sugars are markedly elevated after dinnertime.\par \par Current Diet Includes: During the day also eats dates\par Breakfast 9A: amy crackers, tea w/o sugar or oatmeal or cornflakes or paratha\par Lunch 2PM: roti, rice, murphy \par Dinner 9pm: same as lunch \par snacks on fruits, nuts \par no soda or juice \par \par Additional history:\par HTN - Metoprolol 25 mg QDay, Losartan was stopped when Jardiance was started. \par HLD - Atorvastatin 40 mg QDay\par Takes Vitamin B12 QDay \par He is also taking Plavix 75 mg once daily. \par \par   The patient states that he has not seen cardiology in 3 to 4 years, I recommend that he goes for another baseline evaluation since he does have 3 stents and is on clopidogrel.\par He is also reporting some daytime somnolence, he says he is not sleeping well at night so I also recommend that he mentions this to his primary care physician.

## 2023-05-15 NOTE — PHYSICAL EXAM
[Alert] : alert [No Neck Mass] : no neck mass was observed [No LAD] : no lymphadenopathy [Thyroid Not Enlarged] : the thyroid was not enlarged [No Respiratory Distress] : no respiratory distress [No Accessory Muscle Use] : no accessory muscle use [Clear to Auscultation] : lungs were clear to auscultation bilaterally [Normal PMI] : the apical impulse was normal [Normal S1, S2] : normal S1 and S2 [Normal Rate] : heart rate was normal [Oriented x3] : oriented to person, place, and time [Normal Affect] : the affect was normal [Normal Insight/Judgement] : insight and judgment were intact

## 2023-07-11 ENCOUNTER — NON-APPOINTMENT (OUTPATIENT)
Age: 73
End: 2023-07-11

## 2023-07-20 ENCOUNTER — APPOINTMENT (OUTPATIENT)
Dept: ENDOCRINOLOGY | Facility: CLINIC | Age: 73
End: 2023-07-20
Payer: MEDICARE

## 2023-07-20 VITALS
DIASTOLIC BLOOD PRESSURE: 64 MMHG | OXYGEN SATURATION: 99 % | HEIGHT: 66 IN | BODY MASS INDEX: 20.73 KG/M2 | SYSTOLIC BLOOD PRESSURE: 102 MMHG | WEIGHT: 129 LBS | HEART RATE: 90 BPM

## 2023-07-20 PROCEDURE — 99214 OFFICE O/P EST MOD 30 MIN: CPT

## 2023-07-20 NOTE — ASSESSMENT
[FreeTextEntry1] : 74 y/o male with well controlled T2DM with history of moderate hypoglycemia, h/o CAD s/p 3 stents here for management. Also with HTN, HLD, and Vitamin B12 deficiency\par \par No illnesses, no interim hospitalizations. \par \par T2DM\par -A1C 3/17/2023 8.2%, increased from 7.8% which was increased from 6.6% previously. \par  A1c goal 7 to 7.5%\par A1c 7/20/23: unable to do A1c. \par -He has anemia and on iron pills 1-2x/week.  \par Patient cannot take Levemir, does not control glucose, needs to be on Lantus insulin will do PA. \par \par Plan:\par Ozempic 2 mg once weekly\par metformin 1000 mg twice daily\par Jardiance 25 mg daily\par STOP Levemir, RESTART Lantus at 10 units daily. \par Consider starting prandin before lunch and dinner. 0.5 mg pre-meal. \par check tamie in 4 weeks. \par \par - His diet is heavy in carbs, especially for breakfast.  Counseled on diabetic diet, states he will try to make changes by giving up on cereal and toasts. \par - I had a lengthy discussion regarding healthy diet (consistent carbohydrates and low calorie content) with the patient. I also emphasized to maintain routine exercise activity (30 minutes daily or 150 minutes in the week).\par - I discussed the importance of avoiding mild hypoglycemia (FS<70 mg/dl) and severe hypoglycemia (FS<55 mg/dl) with the patient. I also discussed hypoglycemia correction with 4 oz of juice or 4 glucose tablets and rechecking FS in 15 minutes. If FS is still low, repeat 4oz juice or 4 glucose tablets and consume 12 grams of carbohydrates.\par - UTD with optho as per patient report.\par \par HTN\par - Recommend continue Metoprolol 25 mg QD\par - UACR normal 3/17/23\par -  As per the patient losartan was stopped when Jardiance was started\par \par HLD\par - LDL 47 July 2022 \par - Recommend continue Atorvastatin 40 mg QHS\par \par Vit D deficiency\par - Vit D 26.9 July 2022 \par - Start 1,000 IU vitamin d daily, recheck levels in July. \par \par Daytime somnolence:\par Patient to discuss with his primary care physician in regards to whether he needs a sleep study etc.\par I also recommend that the patient follows up with his cardiologist

## 2023-07-20 NOTE — HISTORY OF PRESENT ILLNESS
[FreeTextEntry1] : 72 y/o male with T2DM here for a follow up.\par \par Since the last visit, he has been in a good health. \par DM was diagnosed 20+ years ago\par Complications include: No neuropathy, no h/o CVA. Has CAD s/p 3 stents (last stent in 2014).\par Optho:Self-reported, last seen in January 2020, no retinopathy as per patient's report\par \par  A1c, 3/17/2023: 8.2%\par A1c 7/20/23: no A1c today\par \par Current DM Medications/Insulin: \par Ozempic 2 mg once weekly\par metformin 1000 mg twice daily\par Jardiance 25 mg daily\par Lantus was changed to Levemir at 10 units. \par \par Humalog was completely stopped.\par \par Current Fingerstick Glucose Logs: \par  23% high, target 68%, very high 6%\par Spiking after dinner. \par \par Current Diet Includes: During the day also eats dates\par Breakfast 9A: amy crackers, tea w/o sugar or oatmeal or cornflakes or paratha\par Lunch 2PM: roti, rice, murphy \par Dinner 9pm: same as lunch \par snacks on fruits, nuts \par no soda or juice \par \par Additional history:\par HTN - Metoprolol 25 mg QDay, Losartan was stopped when Jardiance was started. \par HLD - Atorvastatin 40 mg QDay\par Takes Vitamin B12 QDay \par He is also taking Plavix 75 mg once daily. \par \par   The patient states that he has not seen cardiology in 3 to 4 years, I recommend that he goes for another baseline evaluation since he does have 3 stents and is on clopidogrel.\par He is also reporting some daytime somnolence, he says he is not sleeping well at night so I also recommend that he mentions this to his primary care physician.

## 2023-07-25 LAB
ALBUMIN SERPL ELPH-MCNC: 4.5 G/DL
ALP BLD-CCNC: 69 U/L
ALT SERPL-CCNC: 17 U/L
ANION GAP SERPL CALC-SCNC: 12 MMOL/L
AST SERPL-CCNC: 17 U/L
BILIRUB SERPL-MCNC: 0.4 MG/DL
BUN SERPL-MCNC: 14 MG/DL
CALCIUM SERPL-MCNC: 9.9 MG/DL
CHLORIDE SERPL-SCNC: 96 MMOL/L
CHOLEST SERPL-MCNC: 93 MG/DL
CO2 SERPL-SCNC: 25 MMOL/L
CREAT SERPL-MCNC: 1.06 MG/DL
CREAT SPEC-SCNC: 24 MG/DL
EGFR: 75 ML/MIN/1.73M2
ESTIMATED AVERAGE GLUCOSE: 186 MG/DL
GLUCOSE SERPL-MCNC: 232 MG/DL
HBA1C MFR BLD HPLC: 8.1 %
HDLC SERPL-MCNC: 40 MG/DL
LDLC SERPL CALC-MCNC: 37 MG/DL
MICROALBUMIN 24H UR DL<=1MG/L-MCNC: <1.2 MG/DL
MICROALBUMIN/CREAT 24H UR-RTO: NORMAL MG/G
NONHDLC SERPL-MCNC: 53 MG/DL
POTASSIUM SERPL-SCNC: 4.7 MMOL/L
PROT SERPL-MCNC: 7.3 G/DL
SODIUM SERPL-SCNC: 134 MMOL/L
T4 FREE SERPL-MCNC: 1.2 NG/DL
TRIGL SERPL-MCNC: 75 MG/DL
TSH SERPL-ACNC: 1.73 UIU/ML

## 2023-08-21 ENCOUNTER — NON-APPOINTMENT (OUTPATIENT)
Age: 73
End: 2023-08-21

## 2023-09-12 ENCOUNTER — APPOINTMENT (OUTPATIENT)
Dept: ENDOCRINOLOGY | Facility: CLINIC | Age: 73
End: 2023-09-12

## 2023-09-18 ENCOUNTER — APPOINTMENT (OUTPATIENT)
Dept: ENDOCRINOLOGY | Facility: CLINIC | Age: 73
End: 2023-09-18
Payer: MEDICARE

## 2023-09-18 VITALS
SYSTOLIC BLOOD PRESSURE: 110 MMHG | BODY MASS INDEX: 20.73 KG/M2 | HEART RATE: 89 BPM | HEIGHT: 66 IN | DIASTOLIC BLOOD PRESSURE: 70 MMHG | OXYGEN SATURATION: 99 % | WEIGHT: 129 LBS

## 2023-09-18 PROCEDURE — 99214 OFFICE O/P EST MOD 30 MIN: CPT | Mod: 25

## 2023-09-18 PROCEDURE — 95251 CONT GLUC MNTR ANALYSIS I&R: CPT

## 2023-10-25 RX ORDER — FLASH GLUCOSE SENSOR
KIT MISCELLANEOUS
Qty: 6 | Refills: 3 | Status: ACTIVE | COMMUNITY
Start: 2022-07-14 | End: 1900-01-01

## 2023-11-10 ENCOUNTER — RX RENEWAL (OUTPATIENT)
Age: 73
End: 2023-11-10

## 2023-12-28 ENCOUNTER — APPOINTMENT (OUTPATIENT)
Dept: ENDOCRINOLOGY | Facility: CLINIC | Age: 73
End: 2023-12-28

## 2023-12-28 RX ORDER — INSULIN GLARGINE 100 [IU]/ML
100 INJECTION, SOLUTION SUBCUTANEOUS DAILY
Qty: 3 | Refills: 1 | Status: ACTIVE | COMMUNITY
Start: 2023-04-27 | End: 1900-01-01

## 2023-12-28 RX ORDER — REPAGLINIDE 0.5 MG/1
0.5 TABLET ORAL
Qty: 400 | Refills: 2 | Status: ACTIVE | COMMUNITY
Start: 2023-07-26 | End: 1900-01-01

## 2023-12-28 RX ORDER — METFORMIN HYDROCHLORIDE 1000 MG/1
1000 TABLET, COATED ORAL
Qty: 200 | Refills: 2 | Status: ACTIVE | COMMUNITY
Start: 2022-07-14 | End: 1900-01-01

## 2023-12-28 RX ORDER — EMPAGLIFLOZIN 25 MG/1
25 TABLET, FILM COATED ORAL
Qty: 100 | Refills: 2 | Status: ACTIVE | COMMUNITY
Start: 2022-07-14 | End: 1900-01-01

## 2024-01-24 ENCOUNTER — APPOINTMENT (OUTPATIENT)
Age: 74
End: 2024-01-24

## 2024-03-04 ENCOUNTER — RX RENEWAL (OUTPATIENT)
Age: 74
End: 2024-03-04

## 2024-03-11 ENCOUNTER — APPOINTMENT (OUTPATIENT)
Dept: ENDOCRINOLOGY | Facility: CLINIC | Age: 74
End: 2024-03-11
Payer: MEDICARE

## 2024-03-11 VITALS
HEIGHT: 66 IN | OXYGEN SATURATION: 95 % | SYSTOLIC BLOOD PRESSURE: 100 MMHG | HEART RATE: 63 BPM | DIASTOLIC BLOOD PRESSURE: 70 MMHG | WEIGHT: 124 LBS | BODY MASS INDEX: 19.93 KG/M2

## 2024-03-11 DIAGNOSIS — E78.49 OTHER HYPERLIPIDEMIA: ICD-10-CM

## 2024-03-11 DIAGNOSIS — E16.2 HYPOGLYCEMIA, UNSPECIFIED: ICD-10-CM

## 2024-03-11 DIAGNOSIS — E11.9 TYPE 2 DIABETES MELLITUS W/OUT COMPLICATIONS: ICD-10-CM

## 2024-03-11 DIAGNOSIS — I10 ESSENTIAL (PRIMARY) HYPERTENSION: ICD-10-CM

## 2024-03-11 LAB — GLUCOSE BLDC GLUCOMTR-MCNC: 151

## 2024-03-11 PROCEDURE — G2211 COMPLEX E/M VISIT ADD ON: CPT

## 2024-03-11 PROCEDURE — 99214 OFFICE O/P EST MOD 30 MIN: CPT

## 2024-03-11 PROCEDURE — 95251 CONT GLUC MNTR ANALYSIS I&R: CPT

## 2024-03-11 PROCEDURE — 82962 GLUCOSE BLOOD TEST: CPT

## 2024-03-11 RX ORDER — BLOOD-GLUCOSE,RECEIVER,CONT
EACH MISCELLANEOUS
Qty: 1 | Refills: 1 | Status: ACTIVE | COMMUNITY
Start: 2024-03-11 | End: 1900-01-01

## 2024-03-11 RX ORDER — BLOOD-GLUCOSE SENSOR
EACH MISCELLANEOUS
Qty: 6 | Refills: 3 | Status: ACTIVE | COMMUNITY
Start: 2024-03-11 | End: 1900-01-01

## 2024-04-15 RX ORDER — DULAGLUTIDE 4.5 MG/.5ML
4.5 INJECTION, SOLUTION SUBCUTANEOUS
Qty: 6 | Refills: 3 | Status: ACTIVE | COMMUNITY
Start: 2023-10-20 | End: 1900-01-01

## 2024-04-23 RX ORDER — SEMAGLUTIDE 2.68 MG/ML
8 INJECTION, SOLUTION SUBCUTANEOUS
Qty: 3 | Refills: 3 | Status: ACTIVE | COMMUNITY
Start: 2022-07-14 | End: 1900-01-01

## 2024-07-22 ENCOUNTER — APPOINTMENT (OUTPATIENT)
Dept: ENDOCRINOLOGY | Facility: CLINIC | Age: 74
End: 2024-07-22
Payer: MEDICARE

## 2024-07-22 VITALS
BODY MASS INDEX: 20.09 KG/M2 | HEART RATE: 80 BPM | WEIGHT: 125 LBS | SYSTOLIC BLOOD PRESSURE: 108 MMHG | DIASTOLIC BLOOD PRESSURE: 70 MMHG | HEIGHT: 66 IN | OXYGEN SATURATION: 99 %

## 2024-07-22 DIAGNOSIS — E11.9 TYPE 2 DIABETES MELLITUS W/OUT COMPLICATIONS: ICD-10-CM

## 2024-07-22 DIAGNOSIS — E78.49 OTHER HYPERLIPIDEMIA: ICD-10-CM

## 2024-07-22 DIAGNOSIS — I10 ESSENTIAL (PRIMARY) HYPERTENSION: ICD-10-CM

## 2024-07-22 LAB — HBA1C MFR BLD HPLC: 8.5

## 2024-07-22 PROCEDURE — 99214 OFFICE O/P EST MOD 30 MIN: CPT

## 2024-07-22 PROCEDURE — G2211 COMPLEX E/M VISIT ADD ON: CPT

## 2024-07-22 PROCEDURE — 83036 HEMOGLOBIN GLYCOSYLATED A1C: CPT | Mod: QW

## 2024-07-22 PROCEDURE — 95251 CONT GLUC MNTR ANALYSIS I&R: CPT

## 2024-07-22 NOTE — ASSESSMENT
[FreeTextEntry1] :  73 y/o male with well controlled T2DM with history of moderate hypoglycemia, h/o CAD s/p 3 stents here for management. Also with HTN, HLD, and Vitamin B12 deficiency  T2DM A1c 8.5%, worsened, feeling fatigued and has no appetite.   Plan: RESTART Lantus 8 units in the evening.  Continue Jardiance 25 mg daily Continue Metformin 1000 mg BID REDUCE Ozempic to 1 mg once weekly, goal is to continue on Ozempic but at a lower dose due to severe appetite suppression. Repaglinide 0.5 mg tablet, if he eats breakfast he was advised to take 1 tablet before breakfast, continue 2 tablets before lunch, take 2 tablets before dinner if eating but do not take repaglinide if he is not eating. (Reinforce this again at the next visit) it is likely that Lantus was never causing the lows but dosing repaglinide after his dinner meal was causing him to go low.  - I had a lengthy discussion regarding healthy diet (consistent carbohydrates and low calorie content) with the patient. I also emphasized to maintain routine exercise activity (30 minutes daily or 150 minutes in the week). - I discussed the importance of avoiding mild hypoglycemia (FS<70 mg/dl) and severe hypoglycemia (FS<55 mg/dl) with the patient. I also discussed hypoglycemia correction with 4 oz of juice or 4 glucose tablets and rechecking FS in 15 minutes. If FS is still low, repeat 4oz juice or 4 glucose tablets and consume 12 grams of carbohydrates. - UTD with optho  HTN - at goal - Recommend continue Metoprolol 25 mg QD - ACR normal July 2023  HLD - LDL 37, TG 75, TFTs and LFTs wnl 7/2023 - Recommend continue Atorvastatin 40 mg QHS  Vit D deficinecy - Vit D 26.9 July 2022 - advised to take OTC vit D every other day - check Vit D with the next blood work  Fatigue Check thyroid function test.  F/u 3-4 months  Diabetes Counseling: The patient was counseled on diabetes foot care, long term vascular complications of diabetes, carbohydrate consistent diet, importance of diet and exercise to improve glycemic control, achieve weight loss and improve cardiovascular health, exercise/effect on glucose, hypoglycemia management, action and use of short and long-acting insulin, self monitoring of blood glucose, injection technique, storage, and sharps disposal and sick-day management. Patient was referred to ophthalmology for retinopathy screening. Risks and benefits of diabetic medications were discussed.  I spent over 30 minutes total time with this patient encounter including before, during and after time spent with patient in exam room. Time was spent carefully reviewing the chart in preparing to see patients and performing history & physical exam and ordering appropriate testing and documenting in the medial record.  Additional time was spent counseling the patient regarding my findings and recommendations, recent & current test results as available, any pertinent prior medical records available for review including prior visits, and patients' disease state including risk factor medication.

## 2024-07-22 NOTE — HISTORY OF PRESENT ILLNESS
[FreeTextEntry1] : 75 y/o male with T2DM here for a follow up.  Since the last visit, he has been in a good health. He has lost weight on Ozempic, his appetite reduction is significantly impacting his energy levels and ability to eat.  DM was diagnosed 20+ years ago Complications include: No neuropathy, no h/o CVA. Has CAD s/p 3 stents (last stent in 2014). Optho: has an appointment next week, self-reports no retinopathy.  Advised to fax the report or bring it at the next visit.  Verbalizes understanding.   Current DM Medications/Insulin: Jardiance 25 mg daily Metformin 1000 mg BID Ozempic 2 mg once weekly Prandin 0.5 mg: not taking before breakfast, 2 tablet before lunch, 2 tablet before dinner. The patient is not generally taking repaglinide before breakfast because it is a very small meal, he does take it consistently before lunch, he also does not really eat dinner due to his appetite suppression but oftentimes will take repaglinide to lower his blood glucose.  Previous Medications: Lantus 8 units  Current Fingerstick Glucose Logs:  GMI 8.5% In range 40%, high 37%, very high 23% Has had some symptomatic lows in the morning. He states it happens while he is sleeping. Sometimes around 3-4am.   Current Diet Includes: Usually eats 3 meals but he has been only eating one meal.  Breakfast 9A: amy crackers, tea w/o sugar or oatmeal or cornflakes or paratha Lunch 2PM: roti, rice, murphy Dinner 9pm: same as lunch but lower amount snacks on fruits, nuts no soda or juice  Weight: lost 5 lbs since the last visit.  Exercise: goes to [employer], walks on a treadmill for 30 min for 2-3 days, and if weather is nice, walks outside.  Additional history: HTN - takes Losartan 25 mg QDay, Metoprolol 25 mg QDay HLD - Atorvastatin 40 mg QDay Takes Vitamin B12 QDay

## 2024-07-23 LAB
25(OH)D3 SERPL-MCNC: 24.7 NG/ML
25(OH)D3 SERPL-MCNC: 26.2 NG/ML
ALBUMIN SERPL ELPH-MCNC: 4.6 G/DL
ALBUMIN SERPL ELPH-MCNC: 4.6 G/DL
ALP BLD-CCNC: 64 U/L
ALP BLD-CCNC: 66 U/L
ALT SERPL-CCNC: 17 U/L
ALT SERPL-CCNC: 19 U/L
ANION GAP SERPL CALC-SCNC: 14 MMOL/L
ANION GAP SERPL CALC-SCNC: 15 MMOL/L
AST SERPL-CCNC: 16 U/L
AST SERPL-CCNC: 17 U/L
BASOPHILS # BLD AUTO: 0.04 K/UL
BASOPHILS # BLD AUTO: 0.06 K/UL
BASOPHILS NFR BLD AUTO: 0.6 %
BASOPHILS NFR BLD AUTO: 0.9 %
BILIRUB SERPL-MCNC: 0.4 MG/DL
BILIRUB SERPL-MCNC: 0.4 MG/DL
BUN SERPL-MCNC: 21 MG/DL
BUN SERPL-MCNC: 22 MG/DL
CALCIUM SERPL-MCNC: 9.5 MG/DL
CALCIUM SERPL-MCNC: 9.7 MG/DL
CHLORIDE SERPL-SCNC: 95 MMOL/L
CHLORIDE SERPL-SCNC: 96 MMOL/L
CHOLEST SERPL-MCNC: 115 MG/DL
CHOLEST SERPL-MCNC: 115 MG/DL
CO2 SERPL-SCNC: 24 MMOL/L
CO2 SERPL-SCNC: 24 MMOL/L
CREAT SERPL-MCNC: 1.17 MG/DL
CREAT SERPL-MCNC: 1.18 MG/DL
CREAT SPEC-SCNC: 21 MG/DL
EGFR: 66 ML/MIN/1.73M2
EGFR: 66 ML/MIN/1.73M2
EOSINOPHIL # BLD AUTO: 0.13 K/UL
EOSINOPHIL # BLD AUTO: 0.16 K/UL
EOSINOPHIL NFR BLD AUTO: 2 %
EOSINOPHIL NFR BLD AUTO: 2.4 %
FERRITIN SERPL-MCNC: 15 NG/ML
GLUCOSE SERPL-MCNC: 169 MG/DL
GLUCOSE SERPL-MCNC: 170 MG/DL
HCT VFR BLD CALC: 39 %
HCT VFR BLD CALC: 39.3 %
HDLC SERPL-MCNC: 43 MG/DL
HDLC SERPL-MCNC: 43 MG/DL
HGB BLD-MCNC: 12.9 G/DL
HGB BLD-MCNC: 13.1 G/DL
IMM GRANULOCYTES NFR BLD AUTO: 0.2 %
IMM GRANULOCYTES NFR BLD AUTO: 0.3 %
IRON SATN MFR SERPL: 14 %
IRON SERPL-MCNC: 53 UG/DL
LDLC SERPL CALC-MCNC: 48 MG/DL
LDLC SERPL CALC-MCNC: 49 MG/DL
LYMPHOCYTES # BLD AUTO: 1.99 K/UL
LYMPHOCYTES # BLD AUTO: 2.01 K/UL
LYMPHOCYTES NFR BLD AUTO: 30.3 %
LYMPHOCYTES NFR BLD AUTO: 30.8 %
MAN DIFF?: NORMAL
MAN DIFF?: NORMAL
MCHC RBC-ENTMCNC: 28.4 PG
MCHC RBC-ENTMCNC: 28.5 PG
MCHC RBC-ENTMCNC: 33.1 GM/DL
MCHC RBC-ENTMCNC: 33.3 GM/DL
MCV RBC AUTO: 85.4 FL
MCV RBC AUTO: 85.7 FL
MICROALBUMIN 24H UR DL<=1MG/L-MCNC: <1.2 MG/DL
MICROALBUMIN/CREAT 24H UR-RTO: NORMAL MG/G
MONOCYTES # BLD AUTO: 0.5 K/UL
MONOCYTES # BLD AUTO: 0.51 K/UL
MONOCYTES NFR BLD AUTO: 7.7 %
MONOCYTES NFR BLD AUTO: 7.7 %
NEUTROPHILS # BLD AUTO: 3.78 K/UL
NEUTROPHILS # BLD AUTO: 3.89 K/UL
NEUTROPHILS NFR BLD AUTO: 58.4 %
NEUTROPHILS NFR BLD AUTO: 58.7 %
NONHDLC SERPL-MCNC: 71 MG/DL
NONHDLC SERPL-MCNC: 72 MG/DL
PLATELET # BLD AUTO: 215 K/UL
PLATELET # BLD AUTO: 218 K/UL
POTASSIUM SERPL-SCNC: 4.8 MMOL/L
POTASSIUM SERPL-SCNC: 4.8 MMOL/L
PROT SERPL-MCNC: 7.4 G/DL
PROT SERPL-MCNC: 7.5 G/DL
RBC # BLD: 4.55 M/UL
RBC # BLD: 4.6 M/UL
RBC # FLD: 16.5 %
RBC # FLD: 16.5 %
SODIUM SERPL-SCNC: 133 MMOL/L
SODIUM SERPL-SCNC: 135 MMOL/L
T3 SERPL-MCNC: 113 NG/DL
T4 FREE SERPL-MCNC: 1.2 NG/DL
T4 FREE SERPL-MCNC: 1.2 NG/DL
TIBC SERPL-MCNC: 367 UG/DL
TRIGL SERPL-MCNC: 128 MG/DL
TRIGL SERPL-MCNC: 129 MG/DL
TSH SERPL-ACNC: 2.33 UIU/ML
TSH SERPL-ACNC: 2.37 UIU/ML
UIBC SERPL-MCNC: 314 UG/DL
VIT B12 SERPL-MCNC: 344 PG/ML
VIT B12 SERPL-MCNC: 345 PG/ML
WBC # FLD AUTO: 6.47 K/UL
WBC # FLD AUTO: 6.63 K/UL

## 2024-10-18 ENCOUNTER — APPOINTMENT (OUTPATIENT)
Dept: ENDOCRINOLOGY | Facility: CLINIC | Age: 74
End: 2024-10-18
Payer: MEDICARE

## 2024-10-18 VITALS
BODY MASS INDEX: 20.89 KG/M2 | HEART RATE: 78 BPM | DIASTOLIC BLOOD PRESSURE: 62 MMHG | HEIGHT: 66 IN | OXYGEN SATURATION: 98 % | SYSTOLIC BLOOD PRESSURE: 106 MMHG | WEIGHT: 130 LBS

## 2024-10-18 DIAGNOSIS — R53.82 CHRONIC FATIGUE, UNSPECIFIED: ICD-10-CM

## 2024-10-18 DIAGNOSIS — E78.49 OTHER HYPERLIPIDEMIA: ICD-10-CM

## 2024-10-18 DIAGNOSIS — E11.9 TYPE 2 DIABETES MELLITUS W/OUT COMPLICATIONS: ICD-10-CM

## 2024-10-18 DIAGNOSIS — I10 ESSENTIAL (PRIMARY) HYPERTENSION: ICD-10-CM

## 2024-10-18 PROCEDURE — 95251 CONT GLUC MNTR ANALYSIS I&R: CPT

## 2024-10-18 PROCEDURE — G2211 COMPLEX E/M VISIT ADD ON: CPT

## 2024-10-18 PROCEDURE — 99214 OFFICE O/P EST MOD 30 MIN: CPT

## 2024-10-18 PROCEDURE — 83036 HEMOGLOBIN GLYCOSYLATED A1C: CPT | Mod: QW

## 2024-10-18 RX ORDER — PEN NEEDLE, DIABETIC 32GX 5/32"
32G X 4 MM NEEDLE, DISPOSABLE MISCELLANEOUS
Qty: 4 | Refills: 3 | Status: ACTIVE | COMMUNITY
Start: 2024-10-18 | End: 1900-01-01

## 2024-10-18 RX ORDER — METFORMIN HYDROCHLORIDE 1000 MG/1
1000 TABLET, EXTENDED RELEASE ORAL
Qty: 180 | Refills: 2 | Status: ACTIVE | COMMUNITY
Start: 2024-10-18 | End: 1900-01-01

## 2024-10-21 LAB — HBA1C MFR BLD HPLC: 7.8

## 2024-11-07 RX ORDER — CHOLECALCIFEROL (VITAMIN D3) 10(400)/ML
DROPS ORAL
Qty: 1 | Refills: 3 | Status: ACTIVE | COMMUNITY
Start: 2024-11-07 | End: 1900-01-01

## 2024-12-04 ENCOUNTER — RX RENEWAL (OUTPATIENT)
Age: 74
End: 2024-12-04

## 2025-01-13 ENCOUNTER — NON-APPOINTMENT (OUTPATIENT)
Age: 75
End: 2025-01-13

## 2025-01-13 ENCOUNTER — APPOINTMENT (OUTPATIENT)
Dept: ENDOCRINOLOGY | Facility: CLINIC | Age: 75
End: 2025-01-13
Payer: MEDICARE

## 2025-01-13 VITALS
HEIGHT: 66 IN | WEIGHT: 134 LBS | HEART RATE: 82 BPM | SYSTOLIC BLOOD PRESSURE: 116 MMHG | BODY MASS INDEX: 21.53 KG/M2 | DIASTOLIC BLOOD PRESSURE: 66 MMHG | OXYGEN SATURATION: 99 %

## 2025-01-13 DIAGNOSIS — E11.9 TYPE 2 DIABETES MELLITUS W/OUT COMPLICATIONS: ICD-10-CM

## 2025-01-13 DIAGNOSIS — E78.49 OTHER HYPERLIPIDEMIA: ICD-10-CM

## 2025-01-13 DIAGNOSIS — I10 ESSENTIAL (PRIMARY) HYPERTENSION: ICD-10-CM

## 2025-01-13 DIAGNOSIS — E16.2 HYPOGLYCEMIA, UNSPECIFIED: ICD-10-CM

## 2025-01-13 LAB
GLUCOSE BLDC GLUCOMTR-MCNC: 142
HBA1C MFR BLD HPLC: 8.1

## 2025-01-13 PROCEDURE — G2211 COMPLEX E/M VISIT ADD ON: CPT

## 2025-01-13 PROCEDURE — 83036 HEMOGLOBIN GLYCOSYLATED A1C: CPT | Mod: QW

## 2025-01-13 PROCEDURE — 95251 CONT GLUC MNTR ANALYSIS I&R: CPT

## 2025-01-13 PROCEDURE — 82962 GLUCOSE BLOOD TEST: CPT

## 2025-01-13 PROCEDURE — 99214 OFFICE O/P EST MOD 30 MIN: CPT

## 2025-01-13 RX ORDER — BLOOD SUGAR DIAGNOSTIC
STRIP MISCELLANEOUS DAILY
Qty: 100 | Refills: 3 | Status: ACTIVE | COMMUNITY
Start: 2025-01-13 | End: 1900-01-01

## 2025-01-13 RX ORDER — LANCETS 33 GAUGE
EACH MISCELLANEOUS
Qty: 1 | Refills: 1 | Status: ACTIVE | COMMUNITY
Start: 2025-01-13 | End: 1900-01-01

## 2025-01-21 ENCOUNTER — APPOINTMENT (OUTPATIENT)
Dept: ENDOCRINOLOGY | Facility: CLINIC | Age: 75
End: 2025-01-21

## 2025-03-14 ENCOUNTER — RX RENEWAL (OUTPATIENT)
Age: 75
End: 2025-03-14

## 2025-05-08 ENCOUNTER — APPOINTMENT (OUTPATIENT)
Dept: ENDOCRINOLOGY | Facility: CLINIC | Age: 75
End: 2025-05-08
Payer: MEDICARE

## 2025-05-08 VITALS
HEIGHT: 66 IN | OXYGEN SATURATION: 98 % | BODY MASS INDEX: 21.38 KG/M2 | HEART RATE: 82 BPM | SYSTOLIC BLOOD PRESSURE: 122 MMHG | DIASTOLIC BLOOD PRESSURE: 64 MMHG | WEIGHT: 133 LBS

## 2025-05-08 DIAGNOSIS — D64.9 ANEMIA, UNSPECIFIED: ICD-10-CM

## 2025-05-08 DIAGNOSIS — Z91.199 PATIENT'S NONCOMPLIANCE WITH OTHER MEDICAL TREATMENT AND REGIMEN DUE TO UNSPECIFIED REASON: ICD-10-CM

## 2025-05-08 DIAGNOSIS — R42 DIZZINESS AND GIDDINESS: ICD-10-CM

## 2025-05-08 DIAGNOSIS — R53.82 CHRONIC FATIGUE, UNSPECIFIED: ICD-10-CM

## 2025-05-08 DIAGNOSIS — I10 ESSENTIAL (PRIMARY) HYPERTENSION: ICD-10-CM

## 2025-05-08 DIAGNOSIS — E11.9 TYPE 2 DIABETES MELLITUS W/OUT COMPLICATIONS: ICD-10-CM

## 2025-05-08 DIAGNOSIS — E78.49 OTHER HYPERLIPIDEMIA: ICD-10-CM

## 2025-05-08 DIAGNOSIS — E16.2 HYPOGLYCEMIA, UNSPECIFIED: ICD-10-CM

## 2025-05-08 PROCEDURE — G2211 COMPLEX E/M VISIT ADD ON: CPT

## 2025-05-08 PROCEDURE — 99215 OFFICE O/P EST HI 40 MIN: CPT

## 2025-05-08 PROCEDURE — 83036 HEMOGLOBIN GLYCOSYLATED A1C: CPT | Mod: QW

## 2025-05-09 LAB — HBA1C MFR BLD HPLC: 7.9

## 2025-05-27 ENCOUNTER — APPOINTMENT (OUTPATIENT)
Dept: ENDOCRINOLOGY | Facility: CLINIC | Age: 75
End: 2025-05-27

## 2025-06-12 ENCOUNTER — RX RENEWAL (OUTPATIENT)
Age: 75
End: 2025-06-12

## 2025-08-04 ENCOUNTER — RX RENEWAL (OUTPATIENT)
Age: 75
End: 2025-08-04

## 2025-08-06 ENCOUNTER — APPOINTMENT (OUTPATIENT)
Dept: ENDOCRINOLOGY | Facility: CLINIC | Age: 75
End: 2025-08-06

## 2025-08-12 ENCOUNTER — APPOINTMENT (OUTPATIENT)
Dept: ENDOCRINOLOGY | Facility: CLINIC | Age: 75
End: 2025-08-12
Payer: MEDICARE

## 2025-08-12 VITALS
BODY MASS INDEX: 21.31 KG/M2 | WEIGHT: 132 LBS | DIASTOLIC BLOOD PRESSURE: 64 MMHG | HEART RATE: 77 BPM | SYSTOLIC BLOOD PRESSURE: 120 MMHG | OXYGEN SATURATION: 99 %

## 2025-08-12 DIAGNOSIS — E11.9 TYPE 2 DIABETES MELLITUS W/OUT COMPLICATIONS: ICD-10-CM

## 2025-08-12 DIAGNOSIS — Z91.199 PATIENT'S NONCOMPLIANCE WITH OTHER MEDICAL TREATMENT AND REGIMEN DUE TO UNSPECIFIED REASON: ICD-10-CM

## 2025-08-12 DIAGNOSIS — E78.49 OTHER HYPERLIPIDEMIA: ICD-10-CM

## 2025-08-12 DIAGNOSIS — E16.2 HYPOGLYCEMIA, UNSPECIFIED: ICD-10-CM

## 2025-08-12 DIAGNOSIS — I10 ESSENTIAL (PRIMARY) HYPERTENSION: ICD-10-CM

## 2025-08-12 LAB — HBA1C MFR BLD HPLC: 7.7

## 2025-08-12 PROCEDURE — 95251 CONT GLUC MNTR ANALYSIS I&R: CPT

## 2025-08-12 PROCEDURE — 83036 HEMOGLOBIN GLYCOSYLATED A1C: CPT | Mod: QW

## 2025-08-12 PROCEDURE — 99214 OFFICE O/P EST MOD 30 MIN: CPT | Mod: 25

## 2025-08-12 RX ORDER — DULAGLUTIDE 1.5 MG/.5ML
1.5 INJECTION, SOLUTION SUBCUTANEOUS
Qty: 1 | Refills: 0 | Status: ACTIVE | COMMUNITY
Start: 2025-08-12 | End: 1900-01-01

## 2025-09-08 DIAGNOSIS — E11.9 TYPE 2 DIABETES MELLITUS W/OUT COMPLICATIONS: ICD-10-CM
